# Patient Record
Sex: MALE | Race: BLACK OR AFRICAN AMERICAN | Employment: STUDENT | URBAN - METROPOLITAN AREA
[De-identification: names, ages, dates, MRNs, and addresses within clinical notes are randomized per-mention and may not be internally consistent; named-entity substitution may affect disease eponyms.]

---
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
## 2025-01-08 PROBLEM — R74.01 ELEVATED AST (SGOT): Status: RESOLVED | Noted: 2019-08-07 | Resolved: 2025-01-08

## 2025-01-08 PROBLEM — Z00.129 ENCOUNTER FOR WELL CHILD VISIT AT 14 YEARS OF AGE: Status: RESOLVED | Noted: 2020-06-29 | Resolved: 2025-01-08

## 2025-01-08 PROBLEM — Z02.0 SCHOOL PHYSICAL EXAM: Status: RESOLVED | Noted: 2020-06-29 | Resolved: 2025-01-08

## 2025-02-17 DIAGNOSIS — F90.2 ATTENTION DEFICIT HYPERACTIVITY DISORDER (ADHD), COMBINED TYPE: ICD-10-CM

## 2025-02-21 DIAGNOSIS — F90.2 ATTENTION DEFICIT HYPERACTIVITY DISORDER (ADHD), COMBINED TYPE: ICD-10-CM

## 2025-02-21 RX ORDER — LISDEXAMFETAMINE DIMESYLATE 30 MG/1
30 CAPSULE ORAL DAILY
Qty: 30 CAPSULE | Refills: 0 | OUTPATIENT
Start: 2025-02-21 | End: 2025-03-23

## 2025-02-21 RX ORDER — LISDEXAMFETAMINE DIMESYLATE 30 MG/1
30 CAPSULE ORAL DAILY
Qty: 30 CAPSULE | Refills: 0 | Status: CANCELLED | OUTPATIENT
Start: 2025-02-21 | End: 2025-03-23

## 2025-02-26 NOTE — TELEPHONE ENCOUNTER
Called [facility] pharmacy to verify that they do not fill controlled medications.  [facility] stated they do still have patients prescription on hold if they would like to get it filled.- sent [employer] message to patient

## 2025-04-24 DIAGNOSIS — J30.1 ALLERGIC RHINITIS DUE TO POLLEN, UNSPECIFIED SEASONALITY: ICD-10-CM

## 2025-04-24 RX ORDER — FLUTICASONE PROPIONATE 50 MCG
2 SPRAY, SUSPENSION (ML) NASAL 2 TIMES DAILY
Qty: 16 ML | Refills: 2 | Status: SHIPPED | OUTPATIENT
Start: 2025-04-24

## (undated) DEVICE — SCD SLEEVE KNEE HI BLEND

## (undated) DEVICE — GOWN,SIRUS,FABRIC-REINFORCED,X-LARGE: Brand: MEDLINE

## (undated) DEVICE — Device

## (undated) DEVICE — SOL LACT RINGERS 3000ML

## (undated) DEVICE — FINGER TRAPS MED AR-1616M

## (undated) DEVICE — PADDING CAST SOFT ROLL 3IN

## (undated) DEVICE — UPPER EXTREMITY CDS-LF: Brand: MEDLINE INDUSTRIES, INC.

## (undated) DEVICE — [SMALL-JOINT FULL RADIUS CUTTER, ARTHROSCOPIC SHAVER BLADE,  DO NOT RESTERILIZE,  DO NOT USE IF PACKAGE IS DAMAGED,  KEEP DRY,  KEEP AWAY FROM SUNLIGHT]: Brand: FORMULA

## (undated) DEVICE — SUTURE MONOCRYL 4-0 PS-2

## (undated) DEVICE — 10K/24K ARTHROSCOPY INFLOW TUBE SET: Brand: 10K/24K

## (undated) DEVICE — NON-ADHERENT STRIPS,OIL EMULSION: Brand: CURITY

## (undated) DEVICE — DISPOSABLE TOURNIQUET CUFF SINGLE BLADDER, DUAL PORT AND QUICK CONNECT CONNECTOR: Brand: COLOR CUFF

## (undated) DEVICE — SOL  .9 1000ML BAG

## (undated) DEVICE — STERILE POLYISOPRENE POWDER-FREE SURGICAL GLOVES: Brand: PROTEXIS

## (undated) DEVICE — EXOFIN TISSUE ADHESIVE 1.0ML

## (undated) DEVICE — DISPOSABLE BIPOLAR FORCEPS 4" (10.2CM) JEWELERS, STRAIGHT 0.4MM TIP AND 12 FT. (3.6M) CABLE: Brand: KIRWAN

## (undated) DEVICE — STERILE POLYISOPRENE POWDER-FREE SURGICAL GLOVES WITH EMOLLIENT COATING: Brand: PROTEXIS

## (undated) DEVICE — KIT ASCP FX BSUT TK 7MM 2.4MM

## (undated) DEVICE — C-ARM: Brand: UNBRANDED

## (undated) DEVICE — TUBING CYSTO

## (undated) DEVICE — DERMABOND LIQUID ADHESIVE

## (undated) DEVICE — STERILE HOOK LOCK LATEX FREE ELASTIC BANDAGE 4INX5YD: Brand: HOOK LOCK™

## (undated) DEVICE — [AGGRESSIVE PLUS, SMALL-JOINT CUTTER, ARTHROSCOPIC SHAVER BLADE,  DO NOT RESTERILIZE,  DO NOT USE IF PACKAGE IS DAMAGED,  KEEP DRY,  KEEP AWAY FROM SUNLIGHT]: Brand: FORMULA

## (undated) DEVICE — STANDARD HYPODERMIC NEEDLE,POLYPROPYLENE HUB: Brand: MONOJECT

## (undated) NOTE — LETTER
AUTHORIZATION FOR SURGICAL OPERATION OR OTHER PROCEDURE    1. I hereby authorize Dr. Ulisses Wooten, and [facility] staff assigned to my case to perform the following operation and/or procedure at the [facility] site:    _______________________________________________________________________________________________    Left medial hallux nail avulsion with chemical matrixectomy,   _______________________________________________________________________________________________    2.  My physician has explained the nature and purpose of the operation or other procedure, possible alternative methods of treatment, the risks involved, and the possibility of complication to me.  I acknowledge that no guarantee has been made as to the result that may be obtained.  3.  I recognize that, during the course of this operation, or other procedure, unforseen conditions may necessitate additional or different procedure than those listed above.  I, therefore, further authorize and request that the above named physician, his/her physician assistants or designees perform such procedures as are, in his/her professional opinion, necessary and desirable.  4.  Any tissue or organs removed in the operation or other procedure may be disposed of by and at the discretion of the [facility] and [facility].  5.  I understand that in the event of a medical emergency, I will be transported by local paramedics to [facility] or other hospital emergency department.  6.  I certify that I have read and fully understand the above consent to operation and/or other procedure.    7.  I acknowledge that my physician has explained sedation/analgesia administration to me including the risks and benefits.  I consent to the administration of sedation/analgesia as may be necessary or desirable in the judgement of my physician.    Witness signature:  ___________________________________________________ Date:  ______/______/_____                    Time:  ________ A.M.  P.M.       Patient Name:  ______________________________________________________  (please print)      Patient signature:  ___________________________________________________             Relationship to Patient:           []  Parent    Responsible person                          []  Spouse  In case of minor or                    [] Other  _____________   Incompetent name:  __________________________________________________                               (please print)      _____________      Responsible person  In case of minor or  Incompetent signature:  _______________________________________________    Statement of Physician  My signature below affirms that prior to the time of the procedure, I have explained to the patient and/or his/her guardian, the risks and benefits involved in the proposed treatment and any reasonable alternative to the proposed treatment.  I have also explained the risks and benefits involved in the refusal of the proposed treatment and have answered the patient's questions.                        Date:  ______/______/_______  Provider                      Signature:  __________________________________________________________       Time:  ___________ ALEXIS KANG

## (undated) NOTE — LETTER
[facility] [email or web address] of Child Health Examination       Student's Name  Romy Villatoro Birth [birth year]un Signature                                                                                                                                   Title                           Date     Signature Grade Level/ID#  9th Grade   HEALTH HISTORY          TO BE COMPLETED AND SIGNED BY PARENT/GUARDIAN AND VERIFIED BY HEALTH CARE PROVIDER    ALLERGIES  (Food, drug, insect, other)  Seasonal; Dust Mite Extract MEDICATION  (List all prescribed or taken on a r Bone/Joint problem/injury/scoliosis?    Yes   No  Parent/Guardian Signature                                          Date     PHYSICAL EXAMINATION REQUIREMENTS    Entire section below to be completed by MD/DO/APN/PA       PHYSICAL EXAMINATION REQUIREMENTS ( Eyes Yes     Screen result:   Genito-Urinary Yes  LMP   Nose Yes  Neurological Yes    Throat Yes  Musculoskeletal Yes    Mouth/Dental Yes  Spinal examination Yes    Cardiovascular/HTN Yes  Nutritional status Yes    Respiratory Yes                   Diagnos

## (undated) NOTE — LETTER
Date: 8/3/2021    Patient Name: Elizabeth Bo          To Whom it may concern: The above patient was seen at the [facility] for treatment of a medical condition. Patient can return to football with light contact with the brace on.   Rafia

## (undated) NOTE — LETTER
09/06/19    [place] [id number]      Dear Cora Yi,    [facility] records indicate that you have outstanding lab work and or testing that was ordered for you and has not yet been completed:  Orders Placed This Encounter      US ABDOMEN L

## (undated) NOTE — LETTER
4/4/2023              Vince Bang        [address] [address] [place] [id number]         To Whom It May Concern,    Saulo Duque was seen in our office today. Please excuse his tardiness and absence from school. Please contact our office with any questions or concerns you may have. Sincerely,            Mihai Mendoza.  DO ROSANGELA [facility] Hospital[facility] [address], [facility] [place] [id number] [phone]        Document electronically generated by:  Tony Maldonado

## (undated) NOTE — LETTER
Date: 11/22/2022    Patient Name: Marlene Ferreira          To Whom it may concern: This letter has been written at the patient's request. The above patient was seen at the [facility] for treatment of a medical condition. This patient was seen in our office today, November 22, 2022.         Sincerely,        PIERRE Payne

## (undated) NOTE — LETTER
Date: 5/7/2018    Patient Name: Jeane Starr          To Whom it may concern: This letter has been written at the patient's request. The above patient was seen at the [facility] for treatment of a medical condition.     This patient should

## (undated) NOTE — LETTER
Date & Time: 10/5/2019, 9:04 PM  Patient: Kera Douglas  Encounter Provider(s):    Colin Morris MD       To Whom It May Concern:    Kera Douglas was seen and treated in our department on 10/5/2019.  He should not participate in gym/sports until 10/

## (undated) NOTE — LETTER
Date: 12/26/2019    Patient Name: Jessa Jarrell          To Whom it may concern: This letter has been written at the patient's request. The above patient was seen at the [facility] for treatment of a medical condition.     This patient may

## (undated) NOTE — LETTER
Name:  Kimberly Cheema Year:   Class: Student ID No.:   Address:  [address] [place] [address] [address]. [address] [address] Phone:  [phone] (home)  :  13year old   Name Relationship [address] Work Phone Home Phone Mobile Phone   1.  [facility] Mother   [phone] relative  of heart problems or had an unexpected or unexplained sudden death before age 48?     15. Does anyone in your family have hypertrophic cardiomyopathy, Marfan syndrome, arrhythmogenic right ventricular cardiomyopathy, long QT syndrome, short Q 29. Have you ever had a head injury or concussion? 35. Have you ever had a hit or blow to the head that caused confusion, prolonged headache, or memory problems? 36. Do you have a history of seizure disorder?     37.  Do you have headaches with ex male    Vision: R 20/    L 20/   Corrected:   Yes/No   MEDICAL NORMAL ABNORMAL FINDINGS   Appearance:  Marfan stigmata (kyphoscoliosis, high-arched palate, pectus excavatum,      arachnodactyly, arm span > height, hyperlaxity, myopia, MVP, aortic insuffici (This section for high school students only)   6348-5618 school term     As a prerequisite to participation in [employer] athletic activities, we agree that I/our student will not use performance-enhancing substances as defined in the [facility]

## (undated) NOTE — LETTER
Date: 10/8/2019    Patient Name: Samie Barthel          To Whom it may concern: This letter has been written at the patient's request. The above patient was seen at the [facility] for treatment of a medical condition.     This patient shoul

## (undated) NOTE — LETTER
State of [facility] [employer] of Child Health Examination       Student's Name  Sylvain Solomon Birth Jaun Title                           Date     Signature HEALTH HISTORY          TO BE COMPLETED AND SIGNED BY PARENT/GUARDIAN AND VERIFIED BY HEALTH CARE PROVIDER    ALLERGIES  (Food, drug, insect, other)  Seasonal and Dust Mite Extract MEDICATION  (List all prescribed or taken on a regular basis.)    Current health /educational purposes. Bone/Joint problem/injury/scoliosis?    Yes   No  Parent/Guardian Signature                                          Date     PHYSICAL EXAMINATION REQUIREMENTS    Entire section below to be completed by MD//FABIENN/PA       [facility] Endocrine Yes    Ears Yes                      Screen result: Gastrointestinal Yes    Eyes Yes     Screen result:   Genito-Urinary Yes  LMP   Nose Yes  Neurological Yes    Throat Yes  Musculoskeletal Yes    Mouth/Dental Yes  Spinal examination Yes    Yvonne King Printed by the [employer]

## (undated) NOTE — LETTER
Name:  Amanda Anders Year:  9th Grade Class: Student ID No.:   Address:  [address] [place] [address] [address]. Daysi Troncoso [address] Phone:  [phone] (home)  :  15year old   Name Relationship [address] Work Phone Home Phone Mobile Phone   1.  Susan Mohr unexpected or unexplained sudden death before age 48? (including drowning, unexplained car accident, or sudden infant death syndrome)? No   14.  Does anyone in your family have hypertrophic cardiomyopathy, Marfan syndrome, arrhythmogenic right ventricular c 32. Do you have any rashes, pressure sores, or other skin problems? No   33. Have you had a herpes or MRSA skin infection? No   34. Have you ever had a head injury or concussion? No   35.  Have you ever had a hit or blow to the head that caused confusion, p /73   Pulse 52   Ht 5' 9\" (1.753 m)   Wt 170 lb (77.1 kg)   BMI 25.10 kg/m²  92 %ile (Z= 1.40) based on CDC (Boys, 2-20 Years) BMI-for-age based on BMI available as of 6/29/2020.  male    Vision: R 20/20          L 20/20          BOTH 20/20 [de-identified] Assistants or Advanced Nurse Practitioners to sign off on physicals.    [facility] Substance Testing Policy Consent to Random Testing   (This section for high school students only)   5681-5768 school term    As a prerequisite to participation in [employer]

## (undated) NOTE — LETTER
Date: 12/26/2019    Patient Name: Martita Lundberg          To Whom it may concern: This letter has been written at the patient's request. The above patient was seen at the [facility] for treatment of a medical condition.     This patient may

## (undated) NOTE — LETTER
Date: 11/22/2019    Patient Name: Ernst Jin          To Whom it may concern: This letter has been written at the patient's request. The above patient was seen at the [facility] for treatment of a medical condition.     This patient shou

## (undated) NOTE — ED AVS SNAPSHOT
Sherrie Schwartz   MRN: [id number]    Department:  [facility] Emergency Department   Date of Visit:  8/19/2018           Disclosure     Insurance plans vary and the physician(s) referred by the ER may not be covered by your plan.  Please contact yo CARE PHYSICIAN AT ONCE OR RETURN IMMEDIATELY TO THE EMERGENCY DEPARTMENT. If you have been prescribed any medication(s), please fill your prescription right away and begin taking the medication(s) as directed.   If you believe that any of the medications

## (undated) NOTE — LETTER
Date & Time: 8/19/2018, 8:31 PM  Patient: Marcio Aleman  Encounter Provider(s):    Kayleigh Donald MD       To Whom It May Concern:    Marcio Aleman was seen and treated in our department on 8/19/2018.  He can return to school with these limitations: N

## (undated) NOTE — LETTER
VACCINE ADMINISTRATION RECORD  PARENT / GUARDIAN APPROVAL  Date: 2020  Vaccine administered to: Jessa Jarrell     : 2005    MRN: [id number]    A copy of the appropriate Centers for Disease Control and Prevention Vaccine Information statement ha

## (undated) NOTE — LETTER
Date: 12/2/2022    Patient Name: Vince Bang          To Whom it may concern: The above patient was seen at the [facility] for treatment of a medical condition. This patient was seen today and should not participate in gym class.          Sincerely,    PIERRE Bernal

## (undated) NOTE — LETTER
[facility]                                      Department of Human Services                                   Certificate of Child Health Examination       Student's Name  Warren Bingham Birth Date  9/8/2005  Sex  Male Race/Ethnicity   School/Grade Level/ID#  College   Address  [address] [address] Parent/Guardian      Telephone# - Home   Telephone# - Work                              IMMUNIZATIONS:  To be completed by health care provider.  The mo/da/yr for every dose administered is required.  If a specific vaccine is medically contraindicated, a separate written statement must be attached by the health care provider responsible for completing the health examination explaining the medical reason for the contradiction.   VACCINE/DOSE DATE DATE DATE DATE DATE   Diphtheria, Tetanus and Pertussis (DTP or DTap) 12/1/2005 2/7/2006 5/9/2006 5/8/2007 10/12/2009   Tdap 9/28/2016       Td        Pediatric DT        Inactivate Polio (IPV) 12/1/2005 2/7/2006 5/9/2006 10/12/2009    Oral Polio (OPV)        Haemophilus Influenza Type B (Hib) 12/1/2005 2/7/2006 5/9/2006 9/19/2006    Hepatitis B (HB) 12/1/2005 2/7/2006 5/9/2006     Varicella (Chickenpox) 9/19/2006 10/12/2009      Combined Measles, Mumps and Rubella (MMR) 9/19/2006 10/12/2009      Measles (Rubeola)        Rubella (3-day measles)        Mumps        Pneumococcal 12/1/2005 2/7/2006 5/9/2006 9/19/2006    Meningococcal Conjugate 9/28/2016 4/4/2023         RECOMMENDED, BUT NOT REQUIRED  Vaccine/Dose        VACCINE/DOSE DATE DATE DATE DATE DATE DATE   Hepatitis A 11/4/2006 5/8/2007       HPV 12/26/2019 6/29/2020       Influenza 11/15/2007 10/17/2008 10/12/2009 10/21/2013 9/21/2015 9/28/2016   Men B         Covid            Other:  Specify Immunization/Administered Dates:   Health care provider (MD, , APN, PA , school health professional) verifying above immunization history must sign  below.  Signature                                                                                                                                    Title                           Date     Signature                                                                                                                                              Title                           Date    (If adding dates to the above immunization history section, put your initials by date(s) and sign here.)   ALTERNATIVE PROOF OF IMMUNITY   1.Clinical diagnosis (measles, mumps, hepatitis B) is allowed when verified by physician & supported with lab confirmation. Attach copy of lab result.       *MEASLES (Rubeola)  MO/DA/YR        * MUMPS MO/DA/YR       HEPATITIS B   MO/DA/YR        VARICELLA MO/DA/YR           2.  History of varicella (chickenpox) disease is acceptable if verified by health care provider, school health professional, or health official.       Person signing below is verifying  parent/guardian’s description of varicella disease is indicative of past infection and is accepting such hx as documentation of disease.       Date of Disease                                  Signature                                                                         Title                           Date             3.  Lab Evidence of Immunity (check one)    __Measles*       __Mumps *       __Rubella        __Varicella      __Hepatitis B       *Measles diagnosed on/after 7/1/2002 AND mumps diagnosed on/after 7/1/2013 must be confirmed by laboratory evidence   Completion of Alternatives 1 or 3 MUST be accompanied by Labs & Physician Signature:  Physician Statements of Immunity MUST be submitted to [facility] for review.   Certificates of [employer] Exemption to Immunizations or Physician Medical Statements of Medical Contraindication are Reviewed and Maintained by the School Authority.         Student's Name  Warren Bingham Birth Date  9/8/2005  Sex  Male  School   Grade Level/ID#  College   HEALTH HISTORY          TO BE COMPLETED AND SIGNED BY PARENT/GUARDIAN AND VERIFIED BY HEALTH CARE PROVIDER    ALLERGIES  (Food, drug, insect, other) MEDICATION  (List all prescribed or taken on a regular basis.)     Diagnosis of asthma?  Child wakes during the night coughing   Yes   No    Yes   No    Loss of function of one of paired organs? (eye/ear/kidney/testicle)   Yes   No      Birth Defects?  Developmental delay?   Yes   No    Yes   No  Hospitalizations?  When?  What for?   Yes   No    Blood disorders?  Hemophilia, Sickle Cell, Other?  Explain.   Yes   No  Surgery?  (List all.)  When?  What for?   Yes   No    Diabetes?   Yes   No  Serious injury or illness?   Yes   No    Head Injury/Concussion/Passed out?   Yes   No  TB skin text positive (past/present)?   Yes   No *If yes, refer to local    Seizures?  What are they like?   Yes   No  TB disease (past or present)?   Yes   No *health department   Heart problem/Shortness of breath?   Yes   No  Tobacco use (type, frequency)?   Yes   No    Heart murmur/High blood pressure?   Yes   No  Alcohol/Drug use?   Yes   No    Dizziness or chest pain with exercise?   Yes   No  Fam hx sudden death < age 50 (Cause?)    Yes   No    Eye/Vision problems?  Yes  No   Glasses  Yes   No  Contacts  Yes    No   Last eye exam___  Other concerns? (crossed eye, drooping lids, squinting, difficulty reading) Dental:  ____Braces    ____Bridge    ____Plate    ____Other  Other concerns?     Ear/Hearing problems?   Yes   No  Information may be shared with appropriate personnel for health /educational purposes.   Bone/Joint problem/injury/scoliosis?   Yes   No  Parent/Guardian Signature                                          Date     PHYSICAL EXAMINATION REQUIREMENTS    Entire section below to be completed by MD//APN/PA       PHYSICAL EXAMINATION REQUIREMENTS (head circumference if <2-3 years old):   /73   Pulse 57   Temp 98.1 °F (36.7 °C)  (Tympanic)   Wt 91.6 kg (202 lb)   BMI 28.98 kg/m²     DIABETES SCREENING  BMI>85% age/sex  No And any two of the following:  Family History No   Ethnic Minority  No          Signs of Insulin Resistance (hypertension, dyslipidemia, polycystic ovarian syndrome, acanthosis nigricans)    No           At Risk  No   Lead Risk Questionnaire  Req'd for children 6 months thru 6 yrs enrolled in licensed or public school operated day care, ,  nursery school and/or  (blood test req’d if resides in [facility] or high risk zip)   Questionnaire Administered:Yes   Blood Test Indicated:No   Blood Test Date                 Result:                 TB Skin OR Blood Test   Rec.only for children in high-risk groups incl. children immunosuppressed due to HIV infection or other conditions, frequent travel to or born in high prevalence countries or those exposed to adults in high-risk categories.  See CDCguidelines.  http://www.cdc.gov/tb/publications/factsheets/testing/TB_testing.htm.      No Test Needed        Skin Test:     Date Read                  /      /              Result:                     mm    ______________                         Blood Test:   Date Reported          /      /              Result:                  Value ______________               LAB TESTS (Recommended) Date Results  Date Results   Hemoglobin or Hematocrit   Sickle Cell  (when indicated)     Urinalysis   Developmental Screening Tool     SYSTEM REVIEW Normal Comments/Follow-up/Needs  Normal Comments/Follow-up/Needs   Skin Yes  Endocrine Yes    Ears Yes                      Screen result: Gastrointestinal Yes    Eyes Yes     Screen result:   Genito-Urinary Yes  LMP   Nose Yes  Neurological Yes    Throat Yes  Musculoskeletal Yes    Mouth/Dental Yes  Spinal examination Yes    Cardiovascular/HTN Yes  Nutritional status Yes    Respiratory Yes                   Diagnosis of Asthma: No Mental Health Yes        Currently Prescribed Asthma  Medication:            Quick-relief  medication (e.g. Short Acting Beta Antagonist): No          Controller medication (e.g. inhaled corticosteroid):   No Other   NEEDS/MODIFICATIONS required in the school setting  None DIETARY Needs/Restrictions     None   SPECIAL INSTRUCTIONS/DEVICES e.g. safety glasses, glass eye, chest protector for arrhythmia, pacemaker, prosthetic device, dental bridge, false teeth, athleticsupport/cup     None   MENTAL HEALTH/OTHER   Is there anything else the school should know about this student?  No  If you would like to discuss this student's health with school or school health professional, check title:  __Nurse  __Teacher  __Counselor  __Principal   EMERGENCY ACTION  needed while at school due to child's health condition (e.g., seizures, asthma, insect sting, food, peanut allergy, bleeding problem, diabetes, heart problem)?  No  If yes, please describe.     On the basis of the examination on this day, I approve this child's participation in        (If No or Modified, please attach explanation.)  PHYSICAL EDUCATION    Yes      INTERSCHOLASTIC SPORTS   Yes   Physician/Advanced Practice Nurse/Physician Assistant performing examination  Print Name  Lee Randle DO                                                 Signature                                                                                 Date  4/8/2024   Address/Phone  [address] [address] [phone]

## (undated) NOTE — ED AVS SNAPSHOT
Samie Barthel   MRN: [id number]    Department:  [facility] Emergency Department   Date of Visit:  10/5/2019           Disclosure     Insurance plans vary and the physician(s) referred by the ER may not be covered by your plan.  Please contact yo CARE PHYSICIAN AT ONCE OR RETURN IMMEDIATELY TO THE EMERGENCY DEPARTMENT. If you have been prescribed any medication(s), please fill your prescription right away and begin taking the medication(s) as directed.   If you believe that any of the medications

## (undated) NOTE — LETTER
VACCINE ADMINISTRATION RECORD  PARENT / GUARDIAN APPROVAL  Date: 2023  Vaccine administered to: Mary Jane Hamlin     : 2005    MRN: [id number]    A copy of the appropriate Centers for Disease Control and Prevention Vaccine Information statement has been provided. I have read or have had explained the information about the diseases and the vaccines listed below. There was an opportunity to ask questions and any questions were answered satisfactorily. I believe that I understand the benefits and risks of the vaccine cited and ask that the vaccine(s) listed below be given to me or to the person named above (for whom I am authorized to make this request). VACCINES ADMINISTERED:  Menveo    I have read and hereby agree to be bound by the terms of this agreement as stated above. My signature is valid until revoked by me in writing. This document is signed by parents, relationship: Parents on 2023.:            23                                                                                                                                     Parent / Karin Singh Signature                                                Date    Yue Calderon served as a witness to authentication that the identity of the person signing electronically is in fact the person represented as signing. This document was generated by Yue Calderon on 2023.

## (undated) NOTE — LETTER
Name:  Waldo Gongora Year:  10th Grade Class: Student ID No.:   Address:  [address] [place] [address] [address] Mary Tr[address] [address] Phone:  [phone] (home)  :  13year old   Name Relationship [address] Work Phone Home Phone Mobile Phone   1.  Lisbet Guillen member or relative  of heart problems or had an unexpected or unexplained sudden death before age 48?     15. Does anyone in your family have hypertrophic cardiomyopathy, Marfan syndrome, arrhythmogenic right ventricular cardiomyopathy, long QT syndrom infection? 29. Have you ever had a head injury or concussion? 35. Have you ever had a hit or blow to the head that caused confusion, prolonged headache, or memory problems? 36. Do you have a history of seizure disorder?     37.  Do you have head Yes/No   MEDICAL NORMAL ABNORMAL FINDINGS   Appearance:  Marfan stigmata (kyphoscoliosis, high-arched palate, pectus excavatum,      arachnodactyly, arm span > height, hyperlaxity, myopia, MVP, aortic insufficiency) Yes    Eyes/Ears/Nose/Throat:  Pupils only)   6977-0141 school term     As a prerequisite to participation in [employer] athletic activities, we agree that I/our student will not use performance-enhancing substances as defined in the [facility] Performance-Enhancing Substance Testing Program Protocol.  We

## (undated) NOTE — LETTER
9/10/2021              Nuzhat Barrett        [address] [place] [address] [address] Sean [address]man [address]         To Whom it may concern: This is to certify that Nuzhat Barrett had an appointment on 9/10/2021 with Luzma Cortez. DO Jer. Please excuse him from missed class.

## (undated) NOTE — LETTER
VACCINE ADMINISTRATION RECORD  PARENT / GUARDIAN APPROVAL  Date: 2019  Vaccine administered to: Susy Rose     : 2005    MRN: [id number]    A copy of the appropriate Centers for Disease Control and Prevention Vaccine Information statement h